# Patient Record
Sex: MALE | Race: AMERICAN INDIAN OR ALASKA NATIVE | URBAN - METROPOLITAN AREA
[De-identification: names, ages, dates, MRNs, and addresses within clinical notes are randomized per-mention and may not be internally consistent; named-entity substitution may affect disease eponyms.]

---

## 2019-06-23 ENCOUNTER — HOSPITAL ENCOUNTER (EMERGENCY)
Facility: CLINIC | Age: 34
Discharge: HOME | End: 2019-06-23
Attending: EMERGENCY MEDICINE | Admitting: EMERGENCY MEDICINE

## 2019-06-23 VITALS
SYSTOLIC BLOOD PRESSURE: 139 MMHG | HEART RATE: 111 BPM | RESPIRATION RATE: 30 BRPM | OXYGEN SATURATION: 97 % | TEMPERATURE: 98.1 F | DIASTOLIC BLOOD PRESSURE: 82 MMHG

## 2019-06-23 DIAGNOSIS — T40.1X1A ACCIDENTAL OVERDOSE OF HEROIN, INITIAL ENCOUNTER (H): ICD-10-CM

## 2019-06-23 LAB
CO2 BLDCOV-SCNC: 26 MMOL/L (ref 21–28)
CO2 BLDCOV-SCNC: 28 MMOL/L (ref 21–28)
LACTATE BLD-SCNC: 1.3 MMOL/L (ref 0.7–2.1)
LACTATE BLD-SCNC: 2.4 MMOL/L (ref 0.7–2.1)
PCO2 BLDV: 48 MM HG (ref 40–50)
PCO2 BLDV: 56 MM HG (ref 40–50)
PH BLDV: 7.3 PH (ref 7.32–7.43)
PH BLDV: 7.34 PH (ref 7.32–7.43)
PO2 BLDV: 26 MM HG (ref 25–47)
PO2 BLDV: 59 MM HG (ref 25–47)
SAO2 % BLDV FROM PO2: 39 %
SAO2 % BLDV FROM PO2: 88 %

## 2019-06-23 PROCEDURE — 83605 ASSAY OF LACTIC ACID: CPT

## 2019-06-23 PROCEDURE — 25000128 H RX IP 250 OP 636: Performed by: EMERGENCY MEDICINE

## 2019-06-23 PROCEDURE — 96360 HYDRATION IV INFUSION INIT: CPT | Performed by: EMERGENCY MEDICINE

## 2019-06-23 PROCEDURE — 82803 BLOOD GASES ANY COMBINATION: CPT

## 2019-06-23 PROCEDURE — 99283 EMERGENCY DEPT VISIT LOW MDM: CPT | Mod: Z6 | Performed by: EMERGENCY MEDICINE

## 2019-06-23 PROCEDURE — 99283 EMERGENCY DEPT VISIT LOW MDM: CPT | Mod: 25 | Performed by: EMERGENCY MEDICINE

## 2019-06-23 PROCEDURE — 96361 HYDRATE IV INFUSION ADD-ON: CPT | Performed by: EMERGENCY MEDICINE

## 2019-06-23 RX ADMIN — SODIUM CHLORIDE 1000 ML: 9 INJECTION, SOLUTION INTRAVENOUS at 15:29

## 2019-06-23 NOTE — ED AVS SNAPSHOT
[facility], [place], Emergency Department  [address] [place] [id number]  Phone:  [phone]                                    Demar Escoto   MRN: [id number]    Department:  [facility], Emergency Department   Date of Visit:  6/23/2019           After Visit Summary Signature Page    I have received my discharge instructions, and my questions have been answered. I have discussed any challenges I see with this plan with the nurse or doctor.    ..........................................................................................................................................  Patient/Patient Representative Signature      ..........................................................................................................................................  Patient Representative Print Name and Relationship to Patient    ..................................................               ................................................  Date                                   Time    ..........................................................................................................................................  Reviewed by Signature/Title    ...................................................              ..............................................  Date                                               Time          22EPIC Rev 08/18

## 2019-06-23 NOTE — DISCHARGE INSTRUCTIONS
Please make an appointment to follow up with Your Primary Care Provider as soon as possible.    Avoid heroin use.    Return to the emergency department for any problems.

## 2019-06-23 NOTE — ED NOTES
Bed: ED02  Expected date: 6/23/19  Expected time: 3:06 PM  Means of arrival: Ambulance  Comments:  SPF----resp arrest after heroin use, narcan given. Now, awake and cooperative.

## 2019-06-23 NOTE — ED NOTES
Pt is refusing IV fluid. Pt has expressed a desire to leave. Pt is not compliant with M.D's treatment plan.

## 2019-06-23 NOTE — ED PROVIDER NOTES
[place] EMERGENCY DEPARTMENT ([facility])  6/23/19   History     Chief Complaint   Patient presents with     Drug Overdose     Heroin OD     HPI  Demar Escoto is a 34 year old male who presents to the Emergency Department via EMS for evaluation of heroin overdose.  Patient is currently living in a outpatient treatment type facility.  Reports he was cleaning out a bag that he had and found an old stash of heroin.  He took this and snorted it.  This is the last thing that he remembers.  According to EMS, he was found by staff to be overdosed, not breathing and appeared cyanotic.  They did give him an unknown amount of Narcan nasally and by the time EMS arrived on scene patient had started to breathe but was still somewhat hypoxic.  But at the time they got him into the ambulance he was awake and alert and had admitted to using heroin.  Patient has no complaints at this time.  He denies suicidal intent.    I have reviewed the Medications, Allergies, Past Medical and Surgical History, and Social History in the Epic system.    No past medical history on file.    No past surgical history on file.    No family history on file.    Social History     Tobacco Use     Smoking status: Not on file   Substance Use Topics     Alcohol use: Not on file       No current facility-administered medications for this encounter.      No current outpatient medications on file.      No Known Allergies     Review of Systems   Constitutional: Negative for fever.   Respiratory: Negative for cough and shortness of breath.    Cardiovascular: Negative for chest pain.   Gastrointestinal: Negative for nausea and vomiting.   Psychiatric/Behavioral: Negative for suicidal ideas.   All other systems reviewed and are negative.      Physical Exam   BP: (!) 145/101  Pulse: 116  Heart Rate: 119  Temp: 98.1  F (36.7  C)  Resp: 30  SpO2: 100 %      Physical Exam   Constitutional: He is oriented to person, place, and time. Vital signs are  normal. He appears well-developed and well-nourished.  Non-toxic appearance. He does not appear ill. No distress.   HENT:   Head: Normocephalic and atraumatic.   Mouth/Throat: Oropharynx is clear and moist. No oropharyngeal exudate.   Eyes: Pupils are equal, round, and reactive to light. Conjunctivae and EOM are normal. No scleral icterus.   Neck: Normal range of motion. Neck supple. No JVD present. No tracheal deviation present. No thyromegaly present.   Cardiovascular: Normal rate, regular rhythm, normal heart sounds and intact distal pulses. Exam reveals no gallop and no friction rub.   No murmur heard.  Pulmonary/Chest: Effort normal and breath sounds normal. No respiratory distress.   Abdominal: Soft. Bowel sounds are normal. He exhibits no distension and no mass. There is no tenderness.   Musculoskeletal: Normal range of motion. He exhibits no edema or tenderness.   Lymphadenopathy:     He has no cervical adenopathy.   Neurological: He is alert and oriented to person, place, and time. He has normal strength. No cranial nerve deficit or sensory deficit.   Skin: Skin is warm and dry. No rash noted. No erythema. No pallor.   Psychiatric: He has a normal mood and affect. His speech is normal and behavior is normal. Thought content is not paranoid and not delusional. He expresses no homicidal and no suicidal ideation.   Nursing note and vitals reviewed.      ED Course        Procedures          The Lactic acid level is elevated due to respiratory arrest, at this time there is no sign of severe sepsis or septic shock.       Results for orders placed or performed during the hospital encounter of 06/23/19   ISTAT gases lactate alva POCT   Result Value Ref Range    Ph Venous 7.30 (L) 7.32 - 7.43 pH    PCO2 Venous 56 (H) 40 - 50 mm Hg    PO2 Venous 26 25 - 47 mm Hg    Bicarbonate Venous 28 21 - 28 mmol/L    O2 Sat Venous 39 %    Lactic Acid 2.4 (H) 0.7 - 2.1 mmol/L   ISTAT gases lactate alva POCT   Result Value Ref Range     Ph Venous 7.34 7.32 - 7.43 pH    PCO2 Venous 48 40 - 50 mm Hg    PO2 Venous 59 (H) 25 - 47 mm Hg    Bicarbonate Venous 26 21 - 28 mmol/L    O2 Sat Venous 88 %    Lactic Acid 1.3 0.7 - 2.1 mmol/L            Assessments & Plan (with Medical Decision Making)     This patient presented to the emergency department after heroin overdose.  This appears to be an accidental overdose.  Narcan had been administered at the scene.  Patient was awake and alert in the emergency department.  Venous blood gas did demonstrate a respiratory acidosis with elevated lactate level consistent with the patient's respiratory arrest.  He was hydrated with a liter of IV fluid and was observed for a few hours in the emergency department with no change in his mental status.  Patient reported wanting to leave.  Venous blood gas was repeated and demonstrated resolution of respiratory acidosis as well as improvement of elevated lactate.  At this point I am comfortable discharging him.    I have reviewed the nursing notes.    I have reviewed the findings, diagnosis, plan and need for follow up with the patient.       Medication List      There are no discharge medications for this visit.         Final diagnoses:   Accidental overdose of heroin, initial encounter (H)       6/23/2019   [facility], [place], EMERGENCY DEPARTMENT     Dennis Dean MD  06/25/19 [id number]

## 2019-06-25 ASSESSMENT — ENCOUNTER SYMPTOMS
FEVER: 0
SHORTNESS OF BREATH: 0
NAUSEA: 0
COUGH: 0
VOMITING: 0